# Patient Record
Sex: FEMALE | Race: WHITE | NOT HISPANIC OR LATINO | Employment: OTHER | URBAN - METROPOLITAN AREA
[De-identification: names, ages, dates, MRNs, and addresses within clinical notes are randomized per-mention and may not be internally consistent; named-entity substitution may affect disease eponyms.]

---

## 2021-01-21 ENCOUNTER — HOSPITAL ENCOUNTER (OUTPATIENT)
Dept: OTHER | Facility: HOSPITAL | Age: 66
Discharge: HOME | End: 2021-01-21
Attending: FAMILY MEDICINE

## 2021-01-22 LAB — SARS-COV-2 RNA SPEC QL NAA+PROBE: NOT DETECTED

## 2022-05-10 ENCOUNTER — OFFICE VISIT (OUTPATIENT)
Dept: FAMILY MEDICINE CLINIC | Age: 67
End: 2022-05-10

## 2022-05-10 VITALS
SYSTOLIC BLOOD PRESSURE: 131 MMHG | HEIGHT: 59 IN | HEART RATE: 77 BPM | OXYGEN SATURATION: 100 % | DIASTOLIC BLOOD PRESSURE: 88 MMHG | BODY MASS INDEX: 18.43 KG/M2 | WEIGHT: 91.4 LBS

## 2022-05-10 DIAGNOSIS — D64.9 ANEMIA, UNSPECIFIED TYPE: ICD-10-CM

## 2022-05-10 DIAGNOSIS — R05.9 COUGH: ICD-10-CM

## 2022-05-10 DIAGNOSIS — K27.9 PEPTIC ULCER: Primary | ICD-10-CM

## 2022-05-10 DIAGNOSIS — F17.210 CIGARETTE NICOTINE DEPENDENCE WITHOUT COMPLICATION: ICD-10-CM

## 2022-05-10 DIAGNOSIS — E83.51 HYPOCALCEMIA: ICD-10-CM

## 2022-05-10 DIAGNOSIS — K57.90 DIVERTICULOSIS: ICD-10-CM

## 2022-05-10 PROCEDURE — 99202 OFFICE O/P NEW SF 15 MIN: CPT | Performed by: NURSE PRACTITIONER

## 2022-05-10 RX ORDER — LISINOPRIL 20 MG/1
TABLET ORAL
COMMUNITY
Start: 2022-02-21

## 2022-05-10 RX ORDER — ALBUTEROL SULFATE 90 UG/1
2 AEROSOL, METERED RESPIRATORY (INHALATION) EVERY 4 HOURS PRN
Qty: 9 G | Refills: 1 | Status: SHIPPED | OUTPATIENT
Start: 2022-05-10

## 2022-05-10 RX ORDER — ACETAMINOPHEN 500 MG
500 TABLET ORAL EVERY 6 HOURS PRN
COMMUNITY

## 2022-05-10 RX ORDER — SUCRALFATE 1 G/1
1 TABLET ORAL EVERY 12 HOURS
COMMUNITY
Start: 2022-05-06

## 2022-05-10 RX ORDER — OMEPRAZOLE 40 MG/1
1 CAPSULE, DELAYED RELEASE ORAL DAILY
COMMUNITY
Start: 2022-05-06

## 2022-05-10 NOTE — PROGRESS NOTES
"Chief Complaint  Establish Care (Last PCP True DE LA ROSA in [facility]. /Patient states she had EGD and Colonoscopy on 4/8/2022 at [facility] by Dr. Norm Thibodeaux has to have another in 8 months. Will get records. )    Subjective  Patient is a 66-year-old female who is here today for her first visit.  She recently had upper and lower scopes per Dr. Thibodeaux and was noted to have multiple peptic ulcers and diverticulosis.  She is being treated with omeprazole 40 mg daily and Carafate 1 g every 12 hours.    Essential hypertension is currently under good control on lisinopril 20 mg taking 1/2 tablet once daily.  Denies side effects and does not need refills.  She did have labs done recently at [facility].  Does experience a mild level of fatigue.  Last mammogram was 5 years ago and she does self breast exams.  Declines order for mammogram currently.  She has never had a bone density scan and is aware that smoking and being small frame increases her risk for osteoporosis.  She will let me know when she is ready for an order for a bone density test.  She does wear glasses and cannot recall when her last eye exam was.  States she will make an appointment for an eye exam in the near term.        Claudia Escobedo presents to [facility]          Objective   Vital Signs:   Vitals:    05/10/22 1424   BP: 131/88   BP Location: Left arm   Patient Position: Sitting   Cuff Size: Small Adult   Pulse: 77   SpO2: 100%   Weight: 41.5 kg (91 lb 6.4 oz)   Height: 149.9 cm (59\")      Body mass index is 18.46 kg/m².  Physical Exam  Vitals reviewed.   Constitutional:       General: She is not in acute distress.     Appearance: Normal appearance. She is well-developed.   Neck:      Thyroid: No thyroid mass, thyromegaly or thyroid tenderness.   Cardiovascular:      Rate and Rhythm: Normal rate and regular rhythm.      Pulses:           Posterior tibial pulses are 2+ on the right side and 2+ on the left side.      " Heart sounds: Normal heart sounds.   Pulmonary:      Effort: Pulmonary effort is normal.      Breath sounds: Normal breath sounds.   Musculoskeletal:      Right lower leg: No edema.      Left lower leg: No edema.   Skin:     General: Skin is warm and dry.   Neurological:      General: No focal deficit present.      Mental Status: She is alert.   Psychiatric:         Attention and Perception: Attention normal.         Mood and Affect: Mood and affect normal.         Behavior: Behavior normal.          Result Review :                Assessment and Plan    Diagnoses and all orders for this visit:    1. Peptic ulcer (Primary)  Assessment & Plan:  Follow directions of her surgeon who performed her upper and lower scopes and follow-up as directed.      2. Diverticulosis  Assessment & Plan:  Avoid consuming seeds or nuts which could contribute to diverticulitis.  She is to follow-up if having any abdominal pain.      3. Cigarette nicotine dependence without complication  Assessment & Plan:  Not ready for smoking cessation at this time.  She will consider in the future.      4. Cough  Assessment & Plan:  Follow-up if not improving.  Encourage smoking cessation.    Orders:  -     albuterol sulfate  (90 Base) MCG/ACT inhaler; Inhale 2 puffs Every 4 (Four) Hours As Needed for Wheezing.  Dispense: 9 g; Refill: 1    5. Anemia, unspecified type  Assessment & Plan:  This was noted after patient left her visit.  We did not have [facility] records at time of visit.  Will need to treat with iron supplement and repeat labs in 6 weeks.    Orders:  -     Cancel: CBC w AUTO Differential; Future  -     Cancel: Iron and TIBC; Future  -     CBC w AUTO Differential; Future  -     Iron and TIBC; Future  -     ferrous sulfate 324 MG tablet delayed-release; Take 1 tablet by mouth Daily With Breakfast.  Dispense: 30 tablet; Refill: 2    6. Hypocalcemia  Assessment & Plan:  Repeat CMP  In 6 wks with cbc as above.    Orders:  -     Cancel:  Comprehensive metabolic panel; Future  -     Comprehensive metabolic panel; Future      Follow Up    No follow-ups on file.  Patient was given instructions and counseling regarding her condition or for health maintenance advice. Please see specific information pulled into the AVS if appropriate.

## 2022-05-19 ENCOUNTER — TREATMENT (OUTPATIENT)
Dept: PHYSICAL THERAPY | Facility: CLINIC | Age: 67
End: 2022-05-19

## 2022-05-19 DIAGNOSIS — Z87.81 S/P ORIF (OPEN REDUCTION INTERNAL FIXATION) FRACTURE: Primary | ICD-10-CM

## 2022-05-19 DIAGNOSIS — Z98.890 S/P ORIF (OPEN REDUCTION INTERNAL FIXATION) FRACTURE: Primary | ICD-10-CM

## 2022-05-19 DIAGNOSIS — M25.551 RIGHT HIP PAIN: ICD-10-CM

## 2022-05-19 PROCEDURE — 97161 PT EVAL LOW COMPLEX 20 MIN: CPT | Performed by: PHYSICAL THERAPIST

## 2022-05-19 NOTE — PROGRESS NOTES
Physical Therapy Initial Evaluation and Plan of Care      Patient: Claudia Escobedo   : 1955  Diagnosis/ICD-10 Code:  S/P ORIF (open reduction internal fixation) fracture [Z98.890, Z87.81]  Referring practitioner: Ramakrishna Mason DO  Date of Initial Visit: 2022  Today's Date: 2022  Patient seen for 1 sessions         Visit Diagnoses:    ICD-10-CM ICD-9-CM   1. S/P ORIF (open reduction internal fixation) fracture  Z98.890 V45.89    Z87.81 V15.51   2. Right hip pain  M25.551 719.45       Subjective Questionnaire: LEFS: 7      Subjective Evaluation    History of Present Illness  Mechanism of injury: Pt presents to [facility] to be evaluated S/P R hip ORIF, 22.  RTD 14 days post op     Fractured hip on 5/15/22 from a fall.    D/C from hospital 22  -using ice often  -have raised toilet  -bath tub  -2 small steps to enter single story home    HEP:  -ankle pumps          Patient Occupation: retired CNA [employer], hobbies: garden Pain  Current pain ratin  Location: R hip   Quality: dull ache  Relieving factors: ice and rest  Aggravating factors: prolonged positioning (prolonged walking, standing )    Social Support  Lives with: spouse    Hand dominance: left    Patient Goals  Patient goals for therapy: increased motion, increased strength, return to sport/leisure activities and independence with ADLs/IADLs  Patient goal: fresume hobbies and gardening           Objective          Ambulation     Comments   Transported to therapy in WC (long hallway)     Ambulates with RW, PWB R LE.     Was walking on toe on R LE, taught to put foot flat. Pt able to stand tall.     Dressing in place R anterior lateral hip, dry, no drainage    I transferring sit<>stand.     Ankle AROM WNL,   R knee extension -25 due to ant hip pain,   R hip AROM limited 75% due to pain and dressing limitations.     MMT: deferred due to 2 days post op.                 Assessment & Plan      Assessment  Impairments: abnormal gait, abnormal muscle firing, abnormal or restricted ROM, activity intolerance, impaired balance, lacks appropriate home exercise program, pain with function and weight-bearing intolerance  Functional Limitations: lifting, walking, pulling, uncomfortable because of pain, sitting, standing and stooping  Assessment details: Pt presents with decreased functional mobility S/P hip R ORIF, 5/17/22.      Pt relates    -inability to walk for prolonged periods,  -tender to sit with weight on R side of hip,   -unable to:  crouch, stoop, squat,   sleep on effected side,   participate in recreational activities ,  -pain with transfers in/out of vehicle,  -demonstrates altered gait pattern,  -lack of HEP to address progressive strengthening, ROM, and gait progression.    Pt would benefit from skilled physical  therapy intervention to address the above mentioned deficits.     Pt related understanding of precautions, progression parameters. Questions were addressed as they arose.       Barriers to therapy: unable to drive  Prognosis: good    Goals  Plan Goals: Short Term Goals :  6 weeks:    Pt will report 25% decreased pain in R hip, 50% of the time.     Patient is able to sleep without being disrupted by pain.    Patient has functional ROM of R hip.    Patient will demonstrate 4/5 strength in glutes, hip external rotation..     Patient will be I in HEP each visit.        Long Term Goals: 12 weeks:    Pt will demonstrate normalized gait pattern.     Pt will demonstrate 4+ / 5 strength in  hip stabilizers.     Pt will demonstrate R SLS with good stability, improving ability to dress without c/o pain or limitation.     Patient is able to return to home/work/community activities with minimal to no discomfort.    Patient is able to sit, stand, and walk for as long as needed for home/hobby/community related tasks.     Patient will be  I in final HEP and progression parameters to perform after  formal physical therapy has been discontinued.         Plan  Therapy options: will be seen for skilled therapy services  Planned modality interventions: dry needling, cryotherapy and ultrasound  Other planned modality interventions: any other modality as indicated to benefit pt  Planned therapy interventions: abdominal trunk stabilization, body mechanics training, flexibility, functional ROM exercises, gait training, home exercise program, joint mobilization, manual therapy, neuromuscular re-education, postural training, soft tissue mobilization, strengthening, stretching, therapeutic activities and transfer training  Other planned therapy interventions: any other intervention deemed necessary to benefit pt  Frequency: 2x week  Duration in weeks: 12  Treatment plan discussed with: patient and PTA  Plan details: Continue therapy involving   -education,   -stretching,   -strengthening/ stabilization training of hips/ LEs  -modalities as indicated,   -manual therapy techniques,   -progressive HEP instruction.    Next visit, reassess tolerance to current exercises and modify and/or progress as indicated.         Planned interventions:  Any other modality and/or intervention deemed necessary to benefit the patient.        History # of Personal Factors and/or Comorbidities: MODERATE (1-2)  Examination of Body System(s): # of elements: HIGH (4+)  Clinical Presentation: STABLE   Clinical Decision Making: LOW       Timed:  Manual Therapy:        mins  30919;  Therapeutic Exercise:         mins  56360;     Neuromuscular Hannah:        mins  97049;    Therapeutic Activity:          mins  43401;     Gait Training:      10     mins  96568;     Ultrasound:          mins  50770;    Canalith Repos           ___  mins  40746      Untimed:  Electrical Stimulation:        mins  87541 ( );  Mechanical Traction:         mins  79673;     Low Complexity Evaluation:                      30     mins  91624;  Moderate Complexity  Evaluation:                  mins  65754;   High Complexity Evaluation:                          mins  16421       Timed Treatment:   10   mins   Total Treatment:     40   mins      DATE TREATMENT INITIATED: 5/19/2022              PT SIGNATURE: Shweta Haile PT   [place] License: [id number]  This document has been electronically signed by Shweta Haile PT on May 19, 2022 14:30 EDT        Initial Certification    Certification Period: 5/19/2022 thru 8/16/2022  I certify that the therapy services are furnished while this patient is under my care.  The services outlined above are required by this patient, and will be reviewed every 90 days.     PHYSICIAN: Ramakrishna Mason DO   NPI: [id number]      PHYSICIAN PRINT NAME: ______________________________________________      PHYSICIAN SIGNATURE: ______________________________________________         DATE:________________________________

## 2022-05-24 ENCOUNTER — TREATMENT (OUTPATIENT)
Dept: PHYSICAL THERAPY | Facility: CLINIC | Age: 67
End: 2022-05-24

## 2022-05-24 DIAGNOSIS — Z98.890 S/P ORIF (OPEN REDUCTION INTERNAL FIXATION) FRACTURE: Primary | ICD-10-CM

## 2022-05-24 DIAGNOSIS — M25.551 RIGHT HIP PAIN: ICD-10-CM

## 2022-05-24 DIAGNOSIS — Z87.81 S/P ORIF (OPEN REDUCTION INTERNAL FIXATION) FRACTURE: Primary | ICD-10-CM

## 2022-05-24 PROBLEM — E83.51 HYPOCALCEMIA: Status: ACTIVE | Noted: 2022-05-24

## 2022-05-24 PROBLEM — F17.210 CIGARETTE NICOTINE DEPENDENCE WITHOUT COMPLICATION: Status: ACTIVE | Noted: 2022-05-24

## 2022-05-24 PROBLEM — R05.9 COUGH: Status: ACTIVE | Noted: 2022-05-24

## 2022-05-24 PROBLEM — D64.9 ANEMIA: Status: ACTIVE | Noted: 2022-05-24

## 2022-05-24 PROCEDURE — 97530 THERAPEUTIC ACTIVITIES: CPT | Performed by: PHYSICAL THERAPIST

## 2022-05-24 PROCEDURE — 97110 THERAPEUTIC EXERCISES: CPT | Performed by: PHYSICAL THERAPIST

## 2022-05-24 RX ORDER — FERROUS SULFATE 324(65)MG
324 TABLET, DELAYED RELEASE (ENTERIC COATED) ORAL
Qty: 30 TABLET | Refills: 2 | Status: SHIPPED | OUTPATIENT
Start: 2022-05-24

## 2022-05-24 NOTE — ASSESSMENT & PLAN NOTE
Follow directions of her surgeon who performed her upper and lower scopes and follow-up as directed.

## 2022-05-24 NOTE — ASSESSMENT & PLAN NOTE
Avoid consuming seeds or nuts which could contribute to diverticulitis.  She is to follow-up if having any abdominal pain.

## 2022-05-24 NOTE — ASSESSMENT & PLAN NOTE
This was noted after patient left her visit.  We did not have [facility] records at time of visit.  Will need to treat with iron supplement and repeat labs in 6 weeks.

## 2022-05-27 ENCOUNTER — TREATMENT (OUTPATIENT)
Dept: PHYSICAL THERAPY | Facility: CLINIC | Age: 67
End: 2022-05-27

## 2022-05-27 DIAGNOSIS — Z98.890 S/P ORIF (OPEN REDUCTION INTERNAL FIXATION) FRACTURE: Primary | ICD-10-CM

## 2022-05-27 DIAGNOSIS — Z87.81 S/P ORIF (OPEN REDUCTION INTERNAL FIXATION) FRACTURE: Primary | ICD-10-CM

## 2022-05-27 DIAGNOSIS — M25.551 RIGHT HIP PAIN: ICD-10-CM

## 2022-05-27 PROCEDURE — 97110 THERAPEUTIC EXERCISES: CPT | Performed by: PHYSICAL THERAPIST

## 2022-05-27 PROCEDURE — 97116 GAIT TRAINING THERAPY: CPT | Performed by: PHYSICAL THERAPIST

## 2022-05-27 NOTE — PROGRESS NOTES
Physical Therapy Daily Treatment Note      Patient: Claudia Escobedo   : 1955  Referring practitioner: Ramakrishna Mason DO  Date of Initial Visit: Type: THERAPY  Noted: 2022  Today's Date: 2022  Patient seen for 3 sessions           Subjective Evaluation    History of Present Illness    Subjective comment: Pt reported pain with the first round of marches but not the second.       Objective          Ambulation     Comments   Pt cont to have a stiff gait. There is a pelvic rotation noted and Leg measurements show a possible 2 cm Leg length discrepancy.      See Exercise, Manual, and Modality Logs for complete treatment.       Assessment & Plan     Assessment  Impairments: abnormal gait, abnormal muscle firing, abnormal or restricted ROM, activity intolerance, impaired balance, lacks appropriate home exercise program, pain with function and weight-bearing intolerance  Functional Limitations: lifting, walking, pulling, uncomfortable because of pain, sitting, standing and stooping  Assessment details: Pt is amb with a RW at this time and was advised to bring in the cane she has at home so we can teach her how to amb with a cane.  There is a Pelvic rotation noted at this visit but also the LE were measured and a possible leg length discrepancy of 2cm noted. Pt had some discomfort with the first set of marchces but did not c/o any with the second. Pt R LE externally rotates at the hip with all activities.    Barriers to therapy: unable to drive  Prognosis: good    Goals  Plan Goals: Short Term Goals :  6 weeks:    Pt will report 25% decreased pain in R hip, 50% of the time.     Patient is able to sleep without being disrupted by pain.    Patient has functional ROM of R hip.    Patient will demonstrate 4/5 strength in glutes, hip external rotation..     Patient will be I in HEP each visit.        Long Term Goals: 12 weeks:    Pt will demonstrate normalized gait pattern.     Pt will demonstrate 4+ / 5  strength in  hip stabilizers.     Pt will demonstrate R SLS with good stability, improving ability to dress without c/o pain or limitation.     Patient is able to return to home/work/community activities with minimal to no discomfort.    Patient is able to sit, stand, and walk for as long as needed for home/hobby/community related tasks.     Patient will be  I in final HEP and progression parameters to perform after formal physical therapy has been discontinued.         Plan  Therapy options: will be seen for skilled therapy services  Planned modality interventions: dry needling, cryotherapy and ultrasound  Other planned modality interventions: any other modality as indicated to benefit pt  Planned therapy interventions: abdominal trunk stabilization, body mechanics training, flexibility, functional ROM exercises, gait training, home exercise program, joint mobilization, manual therapy, neuromuscular re-education, postural training, soft tissue mobilization, strengthening, stretching, therapeutic activities and transfer training  Other planned therapy interventions: any other intervention deemed necessary to benefit pt  Frequency: 2x week  Duration in weeks: 12  Treatment plan discussed with: patient and PTA  Plan details: Measure the LEs again to see if there is a leg length discrepancy. Add gait training with a cane if pt remembers to bring it in. Cont with gentle strengthening and stretches as needed.          Visit Diagnoses:    ICD-10-CM ICD-9-CM   1. S/P ORIF (open reduction internal fixation) fracture  Z98.890 V45.89    Z87.81 V15.51   2. Right hip pain  M25.551 719.45       Progress per Plan of Care    Timed:    Therapeutic Exercise:    14     mins  75503;  Manual Therapy:    5     mins  61381;     Neuromuscular Hannah:       mins  69520;    Therapeutic Activity:          mins  30060;     Gait Trainin     mins  32822;     Ultrasound:          mins  07783;      Untimed:  Electrical Stimulation:          mins  56068 ( );  Mechanical Traction:         mins  80517;     Timed Treatment:   27   mins   Total Treatment:     30   mins      Abigail Morel PTA  Physical Therapist Assistant

## 2022-05-31 ENCOUNTER — TREATMENT (OUTPATIENT)
Dept: PHYSICAL THERAPY | Facility: CLINIC | Age: 67
End: 2022-05-31

## 2022-05-31 DIAGNOSIS — M25.551 RIGHT HIP PAIN: ICD-10-CM

## 2022-05-31 DIAGNOSIS — Z87.81 S/P ORIF (OPEN REDUCTION INTERNAL FIXATION) FRACTURE: Primary | ICD-10-CM

## 2022-05-31 DIAGNOSIS — Z98.890 S/P ORIF (OPEN REDUCTION INTERNAL FIXATION) FRACTURE: Primary | ICD-10-CM

## 2022-05-31 PROCEDURE — 97530 THERAPEUTIC ACTIVITIES: CPT | Performed by: PHYSICAL THERAPIST

## 2022-05-31 PROCEDURE — 97110 THERAPEUTIC EXERCISES: CPT | Performed by: PHYSICAL THERAPIST

## 2022-06-03 ENCOUNTER — TELEPHONE (OUTPATIENT)
Dept: PHYSICAL THERAPY | Facility: CLINIC | Age: 67
End: 2022-06-03

## 2022-06-06 ENCOUNTER — TREATMENT (OUTPATIENT)
Dept: PHYSICAL THERAPY | Facility: CLINIC | Age: 67
End: 2022-06-06

## 2022-06-06 DIAGNOSIS — M25.551 RIGHT HIP PAIN: ICD-10-CM

## 2022-06-06 DIAGNOSIS — Z98.890 S/P ORIF (OPEN REDUCTION INTERNAL FIXATION) FRACTURE: Primary | ICD-10-CM

## 2022-06-06 DIAGNOSIS — Z87.81 S/P ORIF (OPEN REDUCTION INTERNAL FIXATION) FRACTURE: Primary | ICD-10-CM

## 2022-06-06 PROCEDURE — 97112 NEUROMUSCULAR REEDUCATION: CPT | Performed by: PHYSICAL THERAPIST

## 2022-06-06 PROCEDURE — 97110 THERAPEUTIC EXERCISES: CPT | Performed by: PHYSICAL THERAPIST

## 2022-06-06 NOTE — PROGRESS NOTES
Physical Therapy Daily Treatment Note      Patient: Claudia Escobedo   : 1955  Referring practitioner: Ramakrishna Mason DO  Date of Initial Visit: Type: THERAPY  Noted: 2022  Today's Date: 2022  Patient seen for 5 sessions           Subjective Evaluation    History of Present Illness    Subjective comment: 3/10        Objective           General Comments     Hip Comments   Limping with amb still noted.     See Exercise, Manual, and Modality Logs for complete treatment.       Assessment & Plan     Assessment  Impairments: abnormal gait, abnormal muscle firing, abnormal or restricted ROM, activity intolerance, impaired balance, lacks appropriate home exercise program, pain with function and weight-bearing intolerance  Functional Limitations: lifting, walking, pulling, uncomfortable because of pain, sitting, standing and stooping  Assessment details: Pt started some balance activities that was challenging for the pt. Pt does have some difficulty with exercises due to the leg length discrepancy and pelvic rotation. Pt shows good amb ability while using her RW but has more difficulty when not using it. Pt started new exercises requiring vc and demonstration for safe and effective performance.    Barriers to therapy: unable to drive  Prognosis: good    Goals  Plan Goals: Short Term Goals :  6 weeks:    Pt will report 25% decreased pain in R hip, 50% of the time.     Patient is able to sleep without being disrupted by pain.    Patient has functional ROM of R hip.    Patient will demonstrate 4/5 strength in glutes, hip external rotation..     Patient will be I in HEP each visit.        Long Term Goals: 12 weeks:    Pt will demonstrate normalized gait pattern.     Pt will demonstrate 4+ / 5 strength in  hip stabilizers.     Pt will demonstrate R SLS with good stability, improving ability to dress without c/o pain or limitation.     Patient is able to return to home/work/community activities with minimal to  no discomfort.    Patient is able to sit, stand, and walk for as long as needed for home/hobby/community related tasks.     Patient will be  I in final HEP and progression parameters to perform after formal physical therapy has been discontinued.         Plan  Therapy options: will be seen for skilled therapy services  Planned modality interventions: dry needling, cryotherapy and ultrasound  Other planned modality interventions: any other modality as indicated to benefit pt  Planned therapy interventions: abdominal trunk stabilization, body mechanics training, flexibility, functional ROM exercises, gait training, home exercise program, joint mobilization, manual therapy, neuromuscular re-education, postural training, soft tissue mobilization, strengthening, stretching, therapeutic activities and transfer training  Other planned therapy interventions: any other intervention deemed necessary to benefit pt  Frequency: 2x week  Duration in weeks: 12  Treatment plan discussed with: patient and PTA  Plan details: Cont with weight baring and strengthening exercises to assist in prepping for stair training and functional activities. Add more balance training. Stretches and manual as needed.          Visit Diagnoses:    ICD-10-CM ICD-9-CM   1. S/P ORIF (open reduction internal fixation) fracture  Z98.890 V45.89    Z87.81 V15.51   2. Right hip pain  M25.551 719.45       Progress per Plan of Care    Timed:    Therapeutic Exercise:    22     mins  68917;  Manual Therapy:         mins  83924;     Neuromuscular Hannah:   10    mins  45406;    Therapeutic Activity:          mins  31618;     Gait Training:           mins  59230;     Ultrasound:          mins  02942;      Untimed:  Electrical Stimulation:         mins  79083 ( );  Mechanical Traction:         mins  72894;     Timed Treatment:   32   mins   Total Treatment:     36   mins      Abigail Morel PTA  Physical Therapist Assistant

## 2022-06-09 ENCOUNTER — TREATMENT (OUTPATIENT)
Dept: PHYSICAL THERAPY | Facility: CLINIC | Age: 67
End: 2022-06-09

## 2022-06-09 DIAGNOSIS — M25.551 RIGHT HIP PAIN: ICD-10-CM

## 2022-06-09 DIAGNOSIS — Z87.81 S/P ORIF (OPEN REDUCTION INTERNAL FIXATION) FRACTURE: Primary | ICD-10-CM

## 2022-06-09 DIAGNOSIS — Z98.890 S/P ORIF (OPEN REDUCTION INTERNAL FIXATION) FRACTURE: Primary | ICD-10-CM

## 2022-06-09 PROCEDURE — 97530 THERAPEUTIC ACTIVITIES: CPT | Performed by: PHYSICAL THERAPIST

## 2022-06-09 PROCEDURE — 97116 GAIT TRAINING THERAPY: CPT | Performed by: PHYSICAL THERAPIST

## 2022-06-09 PROCEDURE — 97110 THERAPEUTIC EXERCISES: CPT | Performed by: PHYSICAL THERAPIST

## 2022-06-09 NOTE — PROGRESS NOTES
Physical Therapy Daily Treatment Note      Patient: Claudia Escobedo   : 1955  Referring practitioner: Ramakrishna Mason DO  Date of Initial Visit: Type: THERAPY  Noted: 2022  Today's Date: 2022  Patient seen for 6 sessions           Subjective Evaluation    History of Present Illness    Subjective comment: 2/10       Objective          Ambulation     Comments   Step through pattern noted with amb with STC.      See Exercise, Manual, and Modality Logs for complete treatment.       Assessment & Plan     Assessment  Impairments: abnormal gait, abnormal muscle firing, abnormal or restricted ROM, activity intolerance, impaired balance, lacks appropriate home exercise program, pain with function and weight-bearing intolerance  Functional Limitations: lifting, walking, pulling, uncomfortable because of pain, sitting, standing and stooping  Assessment details: Pt educated on amb with a STC at this tx visit and stair training with a STC was also performed. Pt is doing very well with all gait training activity and advised to start using a cane instead of a RW. Pt had great difficulty with weight shifting today. It is uncertain if this is from the combination of the rotated pelvic and short limp or just discomfort. Pt was given a green band for use at home.    Barriers to therapy: unable to drive  Prognosis: good    Goals  Plan Goals: Short Term Goals :  6 weeks:    Pt will report 25% decreased pain in R hip, 50% of the time.     Patient is able to sleep without being disrupted by pain.    Patient has functional ROM of R hip.    Patient will demonstrate 4/5 strength in glutes, hip external rotation..     Patient will be I in HEP each visit.        Long Term Goals: 12 weeks:    Pt will demonstrate normalized gait pattern.     Pt will demonstrate 4+ / 5 strength in  hip stabilizers.     Pt will demonstrate R SLS with good stability, improving ability to dress without c/o pain or limitation.     Patient is able  to return to home/work/community activities with minimal to no discomfort.    Patient is able to sit, stand, and walk for as long as needed for home/hobby/community related tasks.     Patient will be  I in final HEP and progression parameters to perform after formal physical therapy has been discontinued.         Plan  Therapy options: will be seen for skilled therapy services  Planned modality interventions: dry needling, cryotherapy and ultrasound  Other planned modality interventions: any other modality as indicated to benefit pt  Planned therapy interventions: abdominal trunk stabilization, body mechanics training, flexibility, functional ROM exercises, gait training, home exercise program, joint mobilization, manual therapy, neuromuscular re-education, postural training, soft tissue mobilization, strengthening, stretching, therapeutic activities and transfer training  Other planned therapy interventions: any other intervention deemed necessary to benefit pt  Frequency: 2x week  Duration in weeks: 12  Treatment plan discussed with: patient and PTA  Plan details: Cont with stair training as there is still safety concerns with patterning. Cont to challenge balance and coordination. Increase strengthening and weight shifting techniques.          Visit Diagnoses:    ICD-10-CM ICD-9-CM   1. S/P ORIF (open reduction internal fixation) fracture  Z98.890 V45.89    Z87.81 V15.51   2. Right hip pain  M25.551 719.45       Progress per Plan of Care    Timed:    Therapeutic Exercise:    14     mins  77023;  Manual Therapy:         mins  91274;     Neuromuscular Hannah:       mins  17329;    Therapeutic Activity:     14     mins  16039;     Gait Trainin     mins  25801;     Ultrasound:          mins  97446;      Untimed:  Electrical Stimulation:         mins  26860 ( );  Mechanical Traction:         mins  77315;     Timed Treatment:   44   mins   Total Treatment:     46   mins      Abigail Morel PTA  Physical  Therapist Assistant

## 2022-06-13 ENCOUNTER — TELEPHONE (OUTPATIENT)
Dept: PHYSICAL THERAPY | Facility: CLINIC | Age: 67
End: 2022-06-13

## 2022-06-16 ENCOUNTER — TREATMENT (OUTPATIENT)
Dept: PHYSICAL THERAPY | Facility: CLINIC | Age: 67
End: 2022-06-16

## 2022-06-16 DIAGNOSIS — Z98.890 S/P ORIF (OPEN REDUCTION INTERNAL FIXATION) FRACTURE: Primary | ICD-10-CM

## 2022-06-16 DIAGNOSIS — M25.551 RIGHT HIP PAIN: ICD-10-CM

## 2022-06-16 DIAGNOSIS — Z87.81 S/P ORIF (OPEN REDUCTION INTERNAL FIXATION) FRACTURE: Primary | ICD-10-CM

## 2022-06-16 PROCEDURE — 97110 THERAPEUTIC EXERCISES: CPT | Performed by: PHYSICAL THERAPIST

## 2022-06-16 PROCEDURE — 97116 GAIT TRAINING THERAPY: CPT | Performed by: PHYSICAL THERAPIST

## 2022-06-16 NOTE — PROGRESS NOTES
Physical Therapy Daily Treatment Note      Patient: Claudia Escobedo   : 1955  Referring practitioner: Ramakrishna Mason DO  Date of Initial Visit: Type: THERAPY  Noted: 2022  Today's Date: 2022  Patient seen for 7 sessions           Subjective     Objective          Ambulation     Comments   Decreased weight shift to the hip noted with amb.      See Exercise, Manual, and Modality Logs for complete treatment.       Assessment & Plan     Assessment  Impairments: abnormal gait, abnormal muscle firing, abnormal or restricted ROM, activity intolerance, impaired balance, lacks appropriate home exercise program, pain with function and weight-bearing intolerance  Functional Limitations: lifting, walking, pulling, uncomfortable because of pain, sitting, standing and stooping  Assessment details: Extensive training with the STC was the main focus of the tx this session. Pt was very nervous about the stair training but ended up doing well toward the end. Pt advised to use handrails in the community when available but the hand rails were not used today to get pt used to areas that do not have hand rails. Hands on pt was given descending the stairs at all times with this tx. More weight shifting to the hip during ambulation is needed.    Barriers to therapy: unable to drive  Prognosis: good    Goals  Plan Goals: Short Term Goals :  6 weeks:    Pt will report 25% decreased pain in R hip, 50% of the time.     Patient is able to sleep without being disrupted by pain.    Patient has functional ROM of R hip.    Patient will demonstrate 4/5 strength in glutes, hip external rotation..     Patient will be I in HEP each visit.        Long Term Goals: 12 weeks:    Pt will demonstrate normalized gait pattern.     Pt will demonstrate 4+ / 5 strength in  hip stabilizers.     Pt will demonstrate R SLS with good stability, improving ability to dress without c/o pain or limitation.     Patient is able to return to  home/work/community activities with minimal to no discomfort.    Patient is able to sit, stand, and walk for as long as needed for home/hobby/community related tasks.     Patient will be  I in final HEP and progression parameters to perform after formal physical therapy has been discontinued.         Plan  Therapy options: will be seen for skilled therapy services  Planned modality interventions: dry needling, cryotherapy and ultrasound  Other planned modality interventions: any other modality as indicated to benefit pt  Planned therapy interventions: abdominal trunk stabilization, body mechanics training, flexibility, functional ROM exercises, gait training, home exercise program, joint mobilization, manual therapy, neuromuscular re-education, postural training, soft tissue mobilization, strengthening, stretching, therapeutic activities and transfer training  Other planned therapy interventions: any other intervention deemed necessary to benefit pt  Frequency: 2x week  Duration in weeks: 12  Treatment plan discussed with: patient and PTA  Plan details: Cont with stair training as there is still safety concerns with patterning. Cont to challenge balance and coordination. Increase strengthening and weight shifting techniques.          Visit Diagnoses:    ICD-10-CM ICD-9-CM   1. S/P ORIF (open reduction internal fixation) fracture  Z98.890 V45.89    Z87.81 V15.51   2. Right hip pain  M25.551 719.45       Progress per Plan of Care    Timed:    Therapeutic Exercise:    10     mins  47888;  Manual Therapy:         mins  70195;     Neuromuscular Hannah:       mins  09838;    Therapeutic Activity:          mins  55761;     Gait Trainin     mins  33941;     Ultrasound:          mins  77701;      Untimed:  Electrical Stimulation:         mins  02540 ( );  Mechanical Traction:         mins  18775;     Timed Treatment:   28   mins   Total Treatment:     30   mins      Abigail Morel PTA  Physical Therapist  Assistant

## 2022-06-20 ENCOUNTER — TREATMENT (OUTPATIENT)
Dept: PHYSICAL THERAPY | Facility: CLINIC | Age: 67
End: 2022-06-20

## 2022-06-20 DIAGNOSIS — Z87.81 S/P ORIF (OPEN REDUCTION INTERNAL FIXATION) FRACTURE: Primary | ICD-10-CM

## 2022-06-20 DIAGNOSIS — Z98.890 S/P ORIF (OPEN REDUCTION INTERNAL FIXATION) FRACTURE: Primary | ICD-10-CM

## 2022-06-20 DIAGNOSIS — M25.551 RIGHT HIP PAIN: ICD-10-CM

## 2022-06-20 PROCEDURE — 97110 THERAPEUTIC EXERCISES: CPT | Performed by: PHYSICAL THERAPIST

## 2022-06-20 PROCEDURE — 97116 GAIT TRAINING THERAPY: CPT | Performed by: PHYSICAL THERAPIST

## 2022-06-20 PROCEDURE — 97140 MANUAL THERAPY 1/> REGIONS: CPT | Performed by: PHYSICAL THERAPIST

## 2022-06-20 NOTE — PROGRESS NOTES
Re-Assessment / Re-Certification        Patient: Claudia Escobedo   : 1955  Diagnosis/ICD-10 Code:  S/P ORIF (open reduction internal fixation) fracture [Z98.890, Z87.81]  Referring practitioner: Ramakrishna Mason DO  Date of Initial Visit: Type: THERAPY  Noted: 2022  Today's Date: 2022  Patient seen for 8 sessions      Subjective:     Visit Diagnoses:    ICD-10-CM ICD-9-CM   1. S/P ORIF (open reduction internal fixation) fracture  Z98.890 V45.89    Z87.81 V15.51   2. Right hip pain  M25.551 719.45         Clinical Progress:IMPROVED  Home Program Compliance: YES  Treatment has included: therapeutic exercise, neuromuscular re-education, manual therapy, therapeutic activity and gait training      Subjective Evaluation    History of Present Illness    Subjective comment: Pt's pain levels are tolerable, some tenderness/soreness.  She is pleased with her progress thus far, she feels over USP improved.  She is still having trouble with some of her gait, confidence with SPC.  Stairs are still very hard.        Objective          Ambulation     Comments   Ambulates with SPC, WBAT, limp noted, decreased standing tolerance on the RLE    I transferring sit<>stand.     R hip flexion 115 degrees  R hip abduction 40 degrees    MMT: R hip flexion, adduction 4/5, abduction 4-/5              Assessment & Plan     Assessment  Impairments: abnormal gait, abnormal muscle firing, abnormal or restricted ROM, activity intolerance, impaired balance, lacks appropriate home exercise program, pain with function and weight-bearing intolerance  Functional Limitations: lifting, walking, pulling, uncomfortable because of pain, sitting, standing and stooping  Assessment details: Stair navigation was an improvement from previous session, getting more comfortable with ascending descending.  HRA is still needed, both strength and confidence.  Gait training continues to improve, still some hesitation and mix up with reciprocal  movements of arms/legs/cane.  Her ROM and strength are both improving.  She will continue to benefit from further PT services to address her overall LE strength, balance, and gait/stair training.     Barriers to therapy: unable to drive  Prognosis: good    Goals  Plan Goals: Short Term Goals :  6 weeks:    Pt will report 25% decreased pain in R hip, 50% of the time. MET    Patient is able to sleep without being disrupted by pain. PROGRESSING    Patient has functional ROM of R hip. MET    Patient will demonstrate 4/5 strength in glutes, hip external rotation.. PROGRESSING    Patient will be I in HEP each visit. MET        Long Term Goals: 12 weeks:    Pt will demonstrate normalized gait pattern.  PROGRESSING    Pt will demonstrate 4+ / 5 strength in  hip stabilizers. PROGRESSING    Pt will demonstrate R SLS with good stability, improving ability to dress without c/o pain or limitation. PROGRESSING     Patient is able to return to home/work/community activities with minimal to no discomfort. PROGRESSING    Patient is able to sit, stand, and walk for as long as needed for home/hobby/community related tasks.  PROGRESSING    Patient will be  I in final HEP and progression parameters to perform after formal physical therapy has been discontinued.         Plan  Therapy options: will be seen for skilled therapy services  Planned modality interventions: dry needling, cryotherapy and ultrasound  Other planned modality interventions: any other modality as indicated to benefit pt  Planned therapy interventions: abdominal trunk stabilization, body mechanics training, flexibility, functional ROM exercises, gait training, home exercise program, joint mobilization, manual therapy, neuromuscular re-education, postural training, soft tissue mobilization, strengthening, stretching, therapeutic activities and transfer training  Other planned therapy interventions: any other intervention deemed necessary to benefit pt  Frequency: 2x  week  Duration in weeks: 8  Treatment plan discussed with: patient and PTA  Plan details: Continue to progress with strength of the LE, balance, stair training, gait training.         Progress toward previous goals: Partially Met       Recommendations: Continue as planned  Timeframe: 2 months  Prognosis to achieve goals: good    Timed:  Manual Therapy:     8    mins  62778;  Therapeutic Exercise:    10     mins  11241;     Neuromuscular Hannah:        mins  41134;    Therapeutic Activity:          mins  85809;     Gait Trainin     mins  54418;     Ultrasound:          mins  04707;    Canalith Repos           ___  mins  66116      Untimed:  Electrical Stimulation:         mins  92180 ( );  Mechanical Traction:         mins  96147;   Dry Needling:                     mins self pay  Re-Eval:                          mins  38937         Timed Treatment:   38   mins   Total Treatment:     43   mins          PT SIGNATURE: Rand Jc PT, DPT  [place] License: [id number]     Certification Period: 2022 thru 2022  I certify that the therapy services are furnished while this patient is under my care.  The services outlined above are required by this patient, and will be reviewed every 90 days.     PHYSICIAN: Ramakrishna Mason DO  NPI: [id number]      PHYSICIAN PRINT NAME: ______________________________________________      PHYSICIAN SIGNATURE: ______________________________________________         DATE:________________________________        Please sign and return via fax to [phone].  Thank you, [facility].

## 2022-06-23 ENCOUNTER — TREATMENT (OUTPATIENT)
Dept: PHYSICAL THERAPY | Facility: CLINIC | Age: 67
End: 2022-06-23

## 2022-06-23 DIAGNOSIS — Z87.81 S/P ORIF (OPEN REDUCTION INTERNAL FIXATION) FRACTURE: Primary | ICD-10-CM

## 2022-06-23 DIAGNOSIS — M25.551 RIGHT HIP PAIN: ICD-10-CM

## 2022-06-23 DIAGNOSIS — Z98.890 S/P ORIF (OPEN REDUCTION INTERNAL FIXATION) FRACTURE: Primary | ICD-10-CM

## 2022-06-23 PROCEDURE — 97110 THERAPEUTIC EXERCISES: CPT | Performed by: PHYSICAL THERAPIST

## 2022-06-23 PROCEDURE — 97112 NEUROMUSCULAR REEDUCATION: CPT | Performed by: PHYSICAL THERAPIST

## 2022-06-23 PROCEDURE — 97530 THERAPEUTIC ACTIVITIES: CPT | Performed by: PHYSICAL THERAPIST

## 2022-06-23 NOTE — PROGRESS NOTES
Physical Therapy Daily Treatment Note      Patient: Claudia Escobedo   : 1955  Referring practitioner: Ramakrishna Mason DO  Date of Initial Visit: Type: THERAPY  Noted: 2022  Today's Date: 2022  Patient seen for 9 sessions           Subjective Evaluation    History of Present Illness    Subjective comment: 3/10       Objective           General Comments     Hip Comments   Decreased glute and quad usage with activities.     See Exercise, Manual, and Modality Logs for complete treatment.       Assessment & Plan     Assessment  Impairments: abnormal gait, abnormal muscle firing, abnormal or restricted ROM, activity intolerance, impaired balance, lacks appropriate home exercise program, pain with function and weight-bearing intolerance  Functional Limitations: lifting, walking, pulling, uncomfortable because of pain, sitting, standing and stooping  Assessment details: Pt has great difficulty with glute and quad firing and trust in the LE to support her during some activities. Compensatory movement hurting the hamstring were used during the lunges so position was moved to make it more comfortable and Tactile cuing was constant for correct performance. Constant cuing was needed to produce correct stance and movement as compensatory patterning is prime  for patient.    Barriers to therapy: unable to drive  Prognosis: good    Goals  Plan Goals: Short Term Goals :  6 weeks:    Pt will report 25% decreased pain in R hip, 50% of the time. MET    Patient is able to sleep without being disrupted by pain. PROGRESSING    Patient has functional ROM of R hip. MET    Patient will demonstrate 4/5 strength in glutes, hip external rotation.. PROGRESSING    Patient will be I in HEP each visit. MET        Long Term Goals: 12 weeks:    Pt will demonstrate normalized gait pattern.  PROGRESSING    Pt will demonstrate 4+ / 5 strength in  hip stabilizers. PROGRESSING    Pt will demonstrate R SLS with good stability,  improving ability to dress without c/o pain or limitation. PROGRESSING     Patient is able to return to home/work/community activities with minimal to no discomfort. PROGRESSING    Patient is able to sit, stand, and walk for as long as needed for home/hobby/community related tasks.  PROGRESSING    Patient will be  I in final HEP and progression parameters to perform after formal physical therapy has been discontinued.         Plan  Therapy options: will be seen for skilled therapy services  Planned modality interventions: dry needling, cryotherapy and ultrasound  Other planned modality interventions: any other modality as indicated to benefit pt  Planned therapy interventions: abdominal trunk stabilization, body mechanics training, flexibility, functional ROM exercises, gait training, home exercise program, joint mobilization, manual therapy, neuromuscular re-education, postural training, soft tissue mobilization, strengthening, stretching, therapeutic activities and transfer training  Other planned therapy interventions: any other intervention deemed necessary to benefit pt  Frequency: 2x week  Duration in weeks: 8  Treatment plan discussed with: patient and PTA  Plan details: Continue to progress pt toward getting greater glute and quad firing for movements. Use positioning and tactile cuing to increase usage of the hip and LE in a more functional pattern.          Visit Diagnoses:    ICD-10-CM ICD-9-CM   1. S/P ORIF (open reduction internal fixation) fracture  Z98.890 V45.89    Z87.81 V15.51   2. Right hip pain  M25.551 719.45       Progress per Plan of Care    Timed:    Therapeutic Exercise:    12     mins  53433;  Manual Therapy:         mins  02690;     Neuromuscular Hannah:   15    mins  86818;    Therapeutic Activity:     11     mins  63269;     Gait Training:           mins  65129;     Ultrasound:          mins  70486;      Untimed:  Electrical Stimulation:         mins  48308 ( );  Mechanical  Traction:         mins  63032;     Timed Treatment:   38   mins   Total Treatment:     41   mins      Abigail Morel PTA  Physical Therapist Assistant

## 2022-06-27 ENCOUNTER — TREATMENT (OUTPATIENT)
Dept: PHYSICAL THERAPY | Facility: CLINIC | Age: 67
End: 2022-06-27

## 2022-06-27 DIAGNOSIS — Z87.81 S/P ORIF (OPEN REDUCTION INTERNAL FIXATION) FRACTURE: Primary | ICD-10-CM

## 2022-06-27 DIAGNOSIS — Z98.890 S/P ORIF (OPEN REDUCTION INTERNAL FIXATION) FRACTURE: Primary | ICD-10-CM

## 2022-06-27 DIAGNOSIS — M25.551 RIGHT HIP PAIN: ICD-10-CM

## 2022-06-27 PROCEDURE — 97110 THERAPEUTIC EXERCISES: CPT | Performed by: PHYSICAL THERAPIST

## 2022-06-27 PROCEDURE — 97112 NEUROMUSCULAR REEDUCATION: CPT | Performed by: PHYSICAL THERAPIST

## 2022-06-27 PROCEDURE — 97140 MANUAL THERAPY 1/> REGIONS: CPT | Performed by: PHYSICAL THERAPIST

## 2022-06-27 NOTE — PROGRESS NOTES
Physical Therapy Daily Treatment Note      Patient: Claudia Escobedo   : 1955  Referring practitioner: Ramakrishna Mason DO  Date of Initial Visit: Type: THERAPY  Noted: 2022  Today's Date: 2022  Patient seen for 10 sessions           Subjective Evaluation    History of Present Illness    Subjective comment: 3/10       Objective           General Comments     Hip Comments   Tightness throughout the IT band noted.     See Exercise, Manual, and Modality Logs for complete treatment.       Assessment & Plan     Assessment  Impairments: abnormal gait, abnormal muscle firing, abnormal or restricted ROM, activity intolerance, impaired balance, lacks appropriate home exercise program, pain with function and weight-bearing intolerance  Functional Limitations: lifting, walking, pulling, uncomfortable because of pain, sitting, standing and stooping  Assessment details: Pt is showing greater ease with the lunges and squats at this visit yet is still getting IT band pain with hip work. Pt has great difficulty with hip hike off step due to poor ability to contract the glute med and the pain in the IT band. There is soft tissue tightness throughout the IT band with palpation.    Barriers to therapy: unable to drive  Prognosis: good    Goals  Plan Goals: Short Term Goals :  6 weeks:    Pt will report 25% decreased pain in R hip, 50% of the time. MET    Patient is able to sleep without being disrupted by pain. PROGRESSING    Patient has functional ROM of R hip. MET    Patient will demonstrate 4/5 strength in glutes, hip external rotation.. PROGRESSING    Patient will be I in HEP each visit. MET        Long Term Goals: 12 weeks:    Pt will demonstrate normalized gait pattern.  PROGRESSING    Pt will demonstrate 4+ / 5 strength in  hip stabilizers. PROGRESSING    Pt will demonstrate R SLS with good stability, improving ability to dress without c/o pain or limitation. PROGRESSING     Patient is able to return to  home/work/community activities with minimal to no discomfort. PROGRESSING    Patient is able to sit, stand, and walk for as long as needed for home/hobby/community related tasks.  PROGRESSING    Patient will be  I in final HEP and progression parameters to perform after formal physical therapy has been discontinued.         Plan  Therapy options: will be seen for skilled therapy services  Planned modality interventions: dry needling, cryotherapy and ultrasound  Other planned modality interventions: any other modality as indicated to benefit pt  Planned therapy interventions: abdominal trunk stabilization, body mechanics training, flexibility, functional ROM exercises, gait training, home exercise program, joint mobilization, manual therapy, neuromuscular re-education, postural training, soft tissue mobilization, strengthening, stretching, therapeutic activities and transfer training  Other planned therapy interventions: any other intervention deemed necessary to benefit pt  Frequency: 2x week  Duration in weeks: 8  Treatment plan discussed with: patient and PTA  Plan details: Continue to help pt fire the glute and quad as well and hamstring. Decrease tightness in the IT band.          Visit Diagnoses:    ICD-10-CM ICD-9-CM   1. S/P ORIF (open reduction internal fixation) fracture  Z98.890 V45.89    Z87.81 V15.51   2. Right hip pain  M25.551 719.45       Progress per Plan of Care    Timed:    Therapeutic Exercise:    16     mins  73022;  Manual Therapy:    10     mins  49280;     Neuromuscular Hannah:   12    mins  54224;    Therapeutic Activity:          mins  28434;     Gait Training:           mins  93121;     Ultrasound:          mins  53801;      Untimed:  Electrical Stimulation:         mins  42664 ( );  Mechanical Traction:         mins  97223;     Timed Treatment:   38   mins   Total Treatment:     40   mins      Abigail Morel PTA  Physical Therapist Assistant

## 2022-06-30 ENCOUNTER — TREATMENT (OUTPATIENT)
Dept: PHYSICAL THERAPY | Facility: CLINIC | Age: 67
End: 2022-06-30

## 2022-06-30 DIAGNOSIS — M25.551 RIGHT HIP PAIN: ICD-10-CM

## 2022-06-30 DIAGNOSIS — Z87.81 S/P ORIF (OPEN REDUCTION INTERNAL FIXATION) FRACTURE: Primary | ICD-10-CM

## 2022-06-30 DIAGNOSIS — Z98.890 S/P ORIF (OPEN REDUCTION INTERNAL FIXATION) FRACTURE: Primary | ICD-10-CM

## 2022-06-30 PROCEDURE — 97116 GAIT TRAINING THERAPY: CPT | Performed by: PHYSICAL THERAPIST

## 2022-06-30 PROCEDURE — 97140 MANUAL THERAPY 1/> REGIONS: CPT | Performed by: PHYSICAL THERAPIST

## 2022-06-30 PROCEDURE — 97110 THERAPEUTIC EXERCISES: CPT | Performed by: PHYSICAL THERAPIST

## 2022-06-30 NOTE — PROGRESS NOTES
Physical Therapy Daily Treatment Note      Patient: Claudia Escobedo   : 1955  Referring practitioner: Ramakrishna Mason DO  Date of Initial Visit: Type: THERAPY  Noted: 2022  Today's Date: 2022  Patient seen for 11 sessions           Subjective Evaluation    History of Present Illness    Subjective comment: 4/10 Pt reports that she was doing more extensive cleaning then she has been and feels that has increased her pain.       Objective          Ambulation     Comments   Trendelenburg gait forming with pt.      See Exercise, Manual, and Modality Logs for complete treatment.       Assessment & Plan     Assessment  Impairments: abnormal gait, abnormal muscle firing, abnormal or restricted ROM, activity intolerance, impaired balance, lacks appropriate home exercise program, pain with function and weight-bearing intolerance  Functional Limitations: lifting, walking, pulling, uncomfortable because of pain, sitting, standing and stooping  Assessment details: Pt has been amb without the cane more and did not have it in the clinic at this visit. Pt was educated extensively on gait and how to start slow with correct sequencing at home and then once established to increase speed. Pt guards at the hip with amb and tends to show a bit of Trendelenburg gait pattern. Balance activities for increased activation were difficult and challenging. Glute Med exercises were used to help activate and strengthen hip for amb and ADLs.    Barriers to therapy: unable to drive  Prognosis: good    Goals  Plan Goals: Short Term Goals :  6 weeks:    Pt will report 25% decreased pain in R hip, 50% of the time. MET    Patient is able to sleep without being disrupted by pain. PROGRESSING    Patient has functional ROM of R hip. MET    Patient will demonstrate 4/5 strength in glutes, hip external rotation.. PROGRESSING    Patient will be I in HEP each visit. MET        Long Term Goals: 12 weeks:    Pt will demonstrate normalized gait  pattern.  PROGRESSING    Pt will demonstrate 4+ / 5 strength in  hip stabilizers. PROGRESSING    Pt will demonstrate R SLS with good stability, improving ability to dress without c/o pain or limitation. PROGRESSING     Patient is able to return to home/work/community activities with minimal to no discomfort. PROGRESSING    Patient is able to sit, stand, and walk for as long as needed for home/hobby/community related tasks.  PROGRESSING    Patient will be  I in final HEP and progression parameters to perform after formal physical therapy has been discontinued.         Plan  Therapy options: will be seen for skilled therapy services  Planned modality interventions: dry needling, cryotherapy and ultrasound  Other planned modality interventions: any other modality as indicated to benefit pt  Planned therapy interventions: abdominal trunk stabilization, body mechanics training, flexibility, functional ROM exercises, gait training, home exercise program, joint mobilization, manual therapy, neuromuscular re-education, postural training, soft tissue mobilization, strengthening, stretching, therapeutic activities and transfer training  Other planned therapy interventions: any other intervention deemed necessary to benefit pt  Frequency: 2x week  Duration in weeks: 8  Treatment plan discussed with: patient and PTA  Plan details: Continue to advance the glute med muscle to activate and fire better with gait. Gait training and advancement as well as functional training is beneficial.          Visit Diagnoses:    ICD-10-CM ICD-9-CM   1. S/P ORIF (open reduction internal fixation) fracture  Z98.890 V45.89    Z87.81 V15.51   2. Right hip pain  M25.551 719.45       Progress per Plan of Care    Timed:    Therapeutic Exercise:    16     mins  03304;  Manual Therapy:    10     mins  93809;     Neuromuscular Hannah:       mins  97344;    Therapeutic Activity:          mins  79798;     Gait Trainin     mins  95746;      Ultrasound:          mins  39264;      Untimed:  Electrical Stimulation:         mins  54210 ( );  Mechanical Traction:         mins  51979;     Timed Treatment:   39   mins   Total Treatment:     42   mins      Abigail Morel PTA  Physical Therapist Assistant

## 2022-07-05 ENCOUNTER — TREATMENT (OUTPATIENT)
Dept: PHYSICAL THERAPY | Facility: CLINIC | Age: 67
End: 2022-07-05

## 2022-07-05 DIAGNOSIS — Z98.890 S/P ORIF (OPEN REDUCTION INTERNAL FIXATION) FRACTURE: Primary | ICD-10-CM

## 2022-07-05 DIAGNOSIS — M25.551 RIGHT HIP PAIN: ICD-10-CM

## 2022-07-05 DIAGNOSIS — Z87.81 S/P ORIF (OPEN REDUCTION INTERNAL FIXATION) FRACTURE: Primary | ICD-10-CM

## 2022-07-05 PROCEDURE — 97110 THERAPEUTIC EXERCISES: CPT | Performed by: PHYSICAL THERAPIST

## 2022-07-05 PROCEDURE — 97140 MANUAL THERAPY 1/> REGIONS: CPT | Performed by: PHYSICAL THERAPIST

## 2022-07-05 NOTE — PROGRESS NOTES
Physical Therapy Daily Treatment Note      Patient: Claudia Escobedo   : 1955  Referring practitioner: Ramakrishna Mason DO  Date of Initial Visit: Type: THERAPY  Noted: 2022  Today's Date: 2022  Patient seen for 12 sessions           Subjective Evaluation    History of Present Illness    Subjective comment: 4/10 Pt reports that she has been hurting in the area of the insision all weekend.       Objective          Ambulation     Comments   Significant limp noted with amb.      See Exercise, Manual, and Modality Logs for complete treatment.       Assessment & Plan     Assessment  Impairments: abnormal gait, abnormal muscle firing, abnormal or restricted ROM, activity intolerance, impaired balance, lacks appropriate home exercise program, pain with function and weight-bearing intolerance  Functional Limitations: lifting, walking, pulling, uncomfortable because of pain, sitting, standing and stooping  Assessment details: Pt has reports of pain in the incision area all weekend and has to hold her hit when going from sit to stand. Pt has tightness and tenderness throughout the glutes and piriformis. All exercises that pressurized the area were stopped. All exercises except for the step overs were tolerated without issue.    Barriers to therapy: unable to drive  Prognosis: good    Goals  Plan Goals: Short Term Goals :  6 weeks:    Pt will report 25% decreased pain in R hip, 50% of the time. MET    Patient is able to sleep without being disrupted by pain. PROGRESSING    Patient has functional ROM of R hip. MET    Patient will demonstrate 4/5 strength in glutes, hip external rotation.. PROGRESSING    Patient will be I in HEP each visit. MET        Long Term Goals: 12 weeks:    Pt will demonstrate normalized gait pattern.  PROGRESSING    Pt will demonstrate 4+ / 5 strength in  hip stabilizers. PROGRESSING    Pt will demonstrate R SLS with good stability, improving ability to dress without c/o pain or  limitation. PROGRESSING     Patient is able to return to home/work/community activities with minimal to no discomfort. PROGRESSING    Patient is able to sit, stand, and walk for as long as needed for home/hobby/community related tasks.  PROGRESSING    Patient will be  I in final HEP and progression parameters to perform after formal physical therapy has been discontinued.         Plan  Therapy options: will be seen for skilled therapy services  Planned modality interventions: dry needling, cryotherapy and ultrasound  Other planned modality interventions: any other modality as indicated to benefit pt  Planned therapy interventions: abdominal trunk stabilization, body mechanics training, flexibility, functional ROM exercises, gait training, home exercise program, joint mobilization, manual therapy, neuromuscular re-education, postural training, soft tissue mobilization, strengthening, stretching, therapeutic activities and transfer training  Other planned therapy interventions: any other intervention deemed necessary to benefit pt  Frequency: 2x week  Duration in weeks: 8  Treatment plan discussed with: patient and PTA  Plan details: Cont to gently find ways to get glutes to fire          Visit Diagnoses:    ICD-10-CM ICD-9-CM   1. S/P ORIF (open reduction internal fixation) fracture  Z98.890 V45.89    Z87.81 V15.51   2. Right hip pain  M25.551 719.45       Progress per Plan of Care    Timed:    Therapeutic Exercise:    20     mins  50542;  Manual Therapy:    8     mins  97009;     Neuromuscular Hannah:       mins  56102;    Therapeutic Activity:          mins  76719;     Gait Training:           mins  53948;     Ultrasound:          mins  31444;      Untimed:  Electrical Stimulation:         mins  78275 ( );  Mechanical Traction:         mins  34064;     Timed Treatment:   28   mins   Total Treatment:     30   mins      Abigail Morel PTA  Physical Therapist Assistant

## 2022-07-08 ENCOUNTER — TREATMENT (OUTPATIENT)
Dept: PHYSICAL THERAPY | Facility: CLINIC | Age: 67
End: 2022-07-08

## 2022-07-08 DIAGNOSIS — Z87.81 S/P ORIF (OPEN REDUCTION INTERNAL FIXATION) FRACTURE: Primary | ICD-10-CM

## 2022-07-08 DIAGNOSIS — Z98.890 S/P ORIF (OPEN REDUCTION INTERNAL FIXATION) FRACTURE: Primary | ICD-10-CM

## 2022-07-08 DIAGNOSIS — M25.551 RIGHT HIP PAIN: ICD-10-CM

## 2022-07-08 PROCEDURE — 97140 MANUAL THERAPY 1/> REGIONS: CPT | Performed by: PHYSICAL THERAPIST

## 2022-07-08 PROCEDURE — 97110 THERAPEUTIC EXERCISES: CPT | Performed by: PHYSICAL THERAPIST

## 2022-07-08 NOTE — PROGRESS NOTES
Physical Therapy Daily Treatment Note      Patient: Claudia Escobedo   : 1955  Referring practitioner: Ramakrishna Mason DO  Date of Initial Visit: No linked episodes  Today's Date: 2022  Patient seen for 13 sessions           Subjective Evaluation    History of Present Illness    Subjective comment: 5/10       Objective           General Comments     Hip Comments   Excessive limping noted when pt amb without STC.     See Exercise, Manual, and Modality Logs for complete treatment.       Assessment & Plan     Assessment  Impairments: abnormal gait, abnormal muscle firing, abnormal or restricted ROM, activity intolerance, impaired balance, lacks appropriate home exercise program, pain with function and weight-bearing intolerance  Functional Limitations: lifting, walking, pulling, uncomfortable because of pain, sitting, standing and stooping  Assessment details: Pt cont to have c/o pain and tenderness in the region along the hip joint. Upon palpation it is noted that the piriformis is very tight and there is tightness in the attachment area. Ice massage was given to help with inflammation in the area. Pt was not able to tolerate much activity without increasing pain at this tx visit.    Barriers to therapy: unable to drive  Prognosis: good    Goals  Plan Goals: Short Term Goals :  6 weeks:    Pt will report 25% decreased pain in R hip, 50% of the time. MET    Patient is able to sleep without being disrupted by pain. PROGRESSING    Patient has functional ROM of R hip. MET    Patient will demonstrate 4/5 strength in glutes, hip external rotation.. PROGRESSING    Patient will be I in HEP each visit. MET        Long Term Goals: 12 weeks:    Pt will demonstrate normalized gait pattern.  PROGRESSING    Pt will demonstrate 4+ / 5 strength in  hip stabilizers. PROGRESSING    Pt will demonstrate R SLS with good stability, improving ability to dress without c/o pain or limitation. PROGRESSING     Patient is able to  return to home/work/community activities with minimal to no discomfort. PROGRESSING    Patient is able to sit, stand, and walk for as long as needed for home/hobby/community related tasks.  PROGRESSING    Patient will be  I in final HEP and progression parameters to perform after formal physical therapy has been discontinued.         Plan  Therapy options: will be seen for skilled therapy services  Planned modality interventions: dry needling, cryotherapy and ultrasound  Other planned modality interventions: any other modality as indicated to benefit pt  Planned therapy interventions: abdominal trunk stabilization, body mechanics training, flexibility, functional ROM exercises, gait training, home exercise program, joint mobilization, manual therapy, neuromuscular re-education, postural training, soft tissue mobilization, strengthening, stretching, therapeutic activities and transfer training  Other planned therapy interventions: any other intervention deemed necessary to benefit pt  Frequency: 2x week  Duration in weeks: 8  Treatment plan discussed with: patient and PTA  Plan details: Cont to address the tenderness and tightness in the hip and gluts. Gently reintroduce strengthening and balance to pt tolerance. Manual as needed.          Visit Diagnoses:    ICD-10-CM ICD-9-CM   1. S/P ORIF (open reduction internal fixation) fracture  Z98.890 V45.89    Z87.81 V15.51   2. Right hip pain  M25.551 719.45       Progress per Plan of Care    Timed:    Therapeutic Exercise:    12     mins  03474;  Manual Therapy:    14     mins  30507;     Neuromuscular Hannah:       mins  86664;    Therapeutic Activity:          mins  51686;     Gait Training:           mins  03637;     Ultrasound:          mins  96268;      Untimed:  Electrical Stimulation:         mins  69811 ( );  Mechanical Traction:         mins  47796;     Timed Treatment:   26   mins   Total Treatment:     28   mins      Abigail Morel PTA  Physical Therapist  Assistant

## 2022-07-12 ENCOUNTER — TELEPHONE (OUTPATIENT)
Dept: FAMILY MEDICINE CLINIC | Age: 67
End: 2022-07-12

## 2022-08-16 ENCOUNTER — DOCUMENTATION (OUTPATIENT)
Dept: PHYSICAL THERAPY | Facility: CLINIC | Age: 67
End: 2022-08-16

## 2022-08-16 DIAGNOSIS — Z87.81 S/P ORIF (OPEN REDUCTION INTERNAL FIXATION) FRACTURE: Primary | ICD-10-CM

## 2022-08-16 DIAGNOSIS — Z98.890 S/P ORIF (OPEN REDUCTION INTERNAL FIXATION) FRACTURE: Primary | ICD-10-CM

## 2022-08-16 NOTE — PROGRESS NOTES
Discharge Summary from Physical Therapy        Patient Information  Claudia Escobedo  1955  Diagnosis/ICD - 10 Code:  S/P ORIF (open reduction internal fixation) fracture [Z98.890, Z87.81]  Referring practitioner: No ref. provider found  Date of initial visit: 8/16/2022  Today's date: 8/16/2022  Patient was seen for Visit count could not be calculated. Make sure you are using a visit which is associated with an episode. sessions      Visit Diagnoses:    ICD-10-CM ICD-9-CM   1. S/P ORIF (open reduction internal fixation) fracture  Z98.890 V45.89    Z87.81 V15.51         Discharge Status of Patient: See PT Note dated 7/08/22    Goals: Partially Met    Discharge Plan: Continue with current home exercise program as instructed                      PT SIGNATURE: Shweta Haile, PT MS,PT  [place] License: [id number]    This document has been electronically signed by Shweta Haile PT on August 16, 2022 14:05 EDT

## 2023-08-31 NOTE — PROGRESS NOTES
----- Message from Ofelia Slade PA-C sent at 8/31/2023 11:00 AM EDT -----  Please let patient know to continue B12 SL 1000mcg daily. F/U with PCP. No F/U needed in our office at this time. Physical Therapy Daily Treatment Note      Patient: Claudia Escobedo   : 1955  Referring practitioner: Ramakrishna Mason DO  Date of Initial Visit: Type: THERAPY  Noted: 2022  Today's Date: 2022  Patient seen for 2 sessions           Visit Diagnoses:    ICD-10-CM ICD-9-CM   1. S/P ORIF (open reduction internal fixation) fracture  Z98.890 V45.89    Z87.81 V15.51   2. Right hip pain  M25.551 719.45       Subjective Evaluation    History of Present Illness    Subjective comment: Pt is not in a lot of pain today - tolerable.           Objective   See Exercise, Manual, and Modality Logs for complete treatment.       Assessment & Plan     Assessment  Impairments: abnormal gait, abnormal muscle firing, abnormal or restricted ROM, activity intolerance, impaired balance, lacks appropriate home exercise program, pain with function and weight-bearing intolerance  Functional Limitations: lifting, walking, pulling, uncomfortable because of pain, sitting, standing and stooping  Assessment details: R ORIF 22 - 1 week post op    Mrs. Escobedo was walking with WBAT, heel to toe movements, RW was proper height. Pain levels were minimal.  She was able to tolerate some light exercise and activity today without increased pain.  Gentle exercises, not to over do it at home, reminder with surgery 1 week post op.  Updated HEP handout.  Reviewed precautions after hip surgery - no hip flexion past 90, no crossing midline, etc.   She is still sleeping in her chair, no bed yet.  She still has incision wrapped. Goes back to the doctor next Tuesday.       Barriers to therapy: unable to drive  Prognosis: good    Goals  Plan Goals: Short Term Goals :  6 weeks:    Pt will report 25% decreased pain in R hip, 50% of the time.     Patient is able to sleep without being disrupted by pain.    Patient has functional ROM of R hip.    Patient will demonstrate 4/5 strength in glutes, hip external rotation..     Patient will be I in HEP  each visit.        Long Term Goals: 12 weeks:    Pt will demonstrate normalized gait pattern.     Pt will demonstrate 4+ / 5 strength in  hip stabilizers.     Pt will demonstrate R SLS with good stability, improving ability to dress without c/o pain or limitation.     Patient is able to return to home/work/community activities with minimal to no discomfort.    Patient is able to sit, stand, and walk for as long as needed for home/hobby/community related tasks.     Patient will be  I in final HEP and progression parameters to perform after formal physical therapy has been discontinued.         Plan  Therapy options: will be seen for skilled therapy services  Planned modality interventions: dry needling, cryotherapy and ultrasound  Other planned modality interventions: any other modality as indicated to benefit pt  Planned therapy interventions: abdominal trunk stabilization, body mechanics training, flexibility, functional ROM exercises, gait training, home exercise program, joint mobilization, manual therapy, neuromuscular re-education, postural training, soft tissue mobilization, strengthening, stretching, therapeutic activities and transfer training  Other planned therapy interventions: any other intervention deemed necessary to benefit pt  Frequency: 2x week  Duration in weeks: 12  Treatment plan discussed with: patient and PTA  Plan details: Continue therapy involving R hip mobility, strengthening, hip stabilization, progress HEP, stretching.           Progress per Plan of Care and Progress strengthening /stabilization /functional activity           Timed:  Manual Therapy:         mins  50088;  Therapeutic Exercise:    15     mins  95491;     Neuromuscular Hannah:        mins  27926;    Therapeutic Activity:     14     mins  07582;     Gait Training:           mins  29854;     Ultrasound:          mins  18527;    [facility]           ___  mins  05505      Untimed:  Electrical Stimulation:         mins  33399  ( );  Mechanical Traction:        mins  39729;   Dry Needling:                     mins self pay       Timed Treatment:   29   mins   Total Treatment:     31   mins      Rand Jc PT, DPT  [place] License #: [id number]